# Patient Record
(demographics unavailable — no encounter records)

---

## 2024-12-31 NOTE — HISTORY OF PRESENT ILLNESS
[Home] : at home, [unfilled] , at the time of the visit. [Medical Office: ([facility])___] : at the medical office located in  [Verbal consent obtained from patient] : the patient, [unfilled] [FreeTextEntry1] : Lynda is a 55-year-old female who presented for initial obesity medicine consultationin sept 2023.Patient has struggled to lose weight despite diet and exercise. She is interested in medications to assist the process of weight loss. She is currently perimenopausal and gets her periods but quite infrequently.  she was approved for Mounjaro and currently on 12,5mg dose. Presents for f/u today.  Patient lost an additional 7 pounds since last being seen 1.5 months ago. Notices a significant decrease in nausea since she increased from 10 to 12.5 mg of Mounjaro. Is approximately 10 pounds away from her goal weight and would like to reduce dose back to 10 mg. I will place a new prescription today.  Labs reviewed that were done on June 19 and within normal limits. Hemoglobin A1c 5.8, prediabetic. Will repeat labs when she is next seen in January 2025.

## 2025-01-07 NOTE — REASON FOR VISIT
[TextEntry] : 55-year-old female who presents for finding of right ureteral stricture  Patient recently established with a new gynecologist who worked her up for endometriosis.  She had an MRI of her pelvis which had an incidental finding of right distal ureter narrowing and dilation in the more proximal ureter.  She denies urologic interventions of a possible passed stone in 2022. Per ER records, she had LLQ pain tender to palpation. She had a CT scan that showed right hydronephrosis to the distal ureter without any stone obvious.  She saw an outside urologist, but no further imaging was ever performed.  She denies ureteroscopy.  She denies pelvic radiation.  She denies pelvic surgeries.  At baseline she voids every 5 hours, denies nocturia, denies difficulty with emptying, denies issue with UTIs, denies gross hematuria.  She does report that in her 20s she would struggling with frequent UTIs.  She did see a urologist who performed an imaging test with a catheter and contrast.  She reports that she was told that she had a narrowing that would make it hard for her to void.   She has a history of 2 C-sections.  She is currently going through menopause.  She denies vaginal bulge.  She has regular bowels  She has asthma and restless leg syndrome  Chart review revealed:  CT abdomen pelvis 2/2022-left parapelvic cysts without hydronephrosis, moderate right hydronephrosis and hydroureter to the level of the bladder without obstructing calculi.  MR pelvis with and without contrast 12/2024-uterine adenomyosis, chronic marked dilated distal right ureter downstream of the iliac crossing which tapers towards the UVJ, suspicious for ureteral stricture  Creatinine 1.08 A1c 5.8%

## 2025-01-07 NOTE — ASSESSMENT
[FreeTextEntry1] : 55-year-old female who presents for possible right ureteral stricture  Reviewed her CT findings from 2022 which also showed a right hydronephrosis.  Her MR pelvis does not show kidneys.  Discussed that we will send her for CT urogram to better understand this new finding.  Also discussed that this may lead to additional workups including nuclear Lasix scan. Follow-up CT urogram

## 2025-01-22 NOTE — HISTORY OF PRESENT ILLNESS
[FreeTextEntry1] : Ms. James is a 55-year-old female presenting to the office today for a follow-up pulmonary evaluation for allergic rhinitis, asthma, RLS, and snoring. Her chief complaint is   -she notes numbness and tingling down her R shoulder into her middle finger -she denies carpal tunnel issues -she denies any weakness in her R hand -she notes good quality of sleep  -she notes getting enough sleep -she notes her energy levels are 8/10 -she notes she's no longer using the treadmill in her basement due to mold exposure -she denies taking any new medications, vitamins, or supplements  -she notes she's on a multivitamin -she notes her asthma is well-controlled -she denies any SOB with the cold air -she notes sinus congestion, which she attributes to the dry heat in her house -she notes she's fully retired -she notes her insurance won't cover Mounjaro unless she gets weekly bloodwork -she requests the yearly flu shot 2024  -she denies any headaches, nausea, emesis, fever, chills, sweats, chest pain, chest pressure, coughing, wheezing, palpitations, diarrhea, constipation, dysphagia, vertigo, myalgias, leg swelling, itchy eyes, itchy ears, heartburn, reflux, or sour taste in the mouth.

## 2025-01-22 NOTE — ADDENDUM
[FreeTextEntry1] : Documented by Bonnie Cisse acting as a scribe for Dr. Alan Contreras on 01/22/2025. All medical record entries made by the Scribe were at my, Dr. Alan Contreras's, direction and personally dictated by me on 01/22/2025. I have reviewed the chart and agree that the record accurately reflects my personal performance of the history, physical exam, assessment and plan. I have also personally directed, reviewed, and agree with the discharge instructions.

## 2025-01-22 NOTE — ASSESSMENT
[FreeTextEntry1] : Ms. James is a 55-year-old female with a history of asthma, anemia, covid-19 7/2022 allergies, OSAS, post-nasal drip syndrome, and RLS- Her number one issue is weight (still); RLS (DD in place for OSAS)- improved s/p Mounjaro (insurance off)  problem 1: asthma (quiet) -continue Dulera 200 2BID -continue Alvesco 160 2 puffs BID -continue Ventolin 2 puffs Q6H, pre-exercise - Continue Singulair 10 mg QHS  -Asthma is believed to be caused by inherited (genetic) and environmental factor, but its exact cause is unknown. Asthma may be triggered by allergens, lung infections, or irritants in the air. Asthma triggers are different for each person -Inhaler technique reviewed as well as oral hygiene techniques reviewed with patient. Avoidance of cold air, extremes of temperature, rescue inhaler should be used before exercise. Order of medication reviewed with patient. Recommended use of a cool mist humidifier in the bedroom.  problem 2: allergic rhinitis -continue to use Claritin 10 mg QAM -continue Zyrtec 10 mg QHS -Continue Astelin.10 1 sniff/nostril QHS  -Environmental measures for allergies were encouraged including mattress and pillow cover, air purifier, and environmental controls.  Problem 3: GERD -continue Pepcid 40 mg QHS  Things to avoid including overeating, spicy foods, tight clothing, eating within three hours of bed, this list is not all inclusive. -For treatment of reflux, possible options discussed including diet control, H2 blockers, PPIs, as well as coating motility agents discussed as treatment options. Timing of meals and proximity of last meal to sleep were discussed. If symptoms persist, a formal gastrointestinal evaluation is needed.  problem 4: mildly overweight- improved -s/p Mounjaro (insurance issue)- move to Weight [employer] -recommended consultation with Dr. Dwayne Siegel -Recommend [place] diet -recommended Berberine OTC supplement for visceral fat loss   -Weight loss, exercise, and diet control were discussed and are highly encouraged. Treatment options were given such as, aqua therapy, and contacting a nutritionist. Recommended to use the elliptical, stationary bike, less use of treadmill. Obesity is associated with worsening asthma, shortness of breath, and potential for cardiac disease, diabetes, and other underlying medical conditions.  problem 5: snoring (weight issue) / (+) OSAS (risk factor: snoring, EDS, metabolism) - s/p HSS- DD in place -Recommend Weight loss and positional sleep -Recommend "Excite"  -Sleep apnea is associated with adverse clinical consequences which can affect most organ systems. Cardiovascular disease risk includes arrhythmias, atrial fibrillation, hypertension, coronary artery disease, and stroke. Metabolic disorders include diabetes type 2, non-alcoholic fatty liver disease. Mood disorder especially depression; and cognitive decline especially in the elderly. Associations with chronic reflux/Rosales's esophagus some but not all inclusive. -Reasons include arousal consistent with hypopnea; respiratory events most prominent in REM sleep or supine position; therefore sleep staging and body position are important for accurate diagnosis and estimation of AHI.  problem 6: RLS -Recommend Theraworx -Requip 2 mg extended release QHS -Gabapentin 100 mg QHS (titrate up to 300 mg PRN)  Restless Legs Syndrome (RLS), also known as Hernandez-Ekbom Disease, is a common sleep -related movement disorder. About 1 in 10 adults in the [place] have problems from restless leg syndrome. It also can be seen in about 2% of children. Women are twice as likely as men to have RLS. People with RLS will have symptoms most often during times when they are less active, especially at bedtime. RLS most often causes an overwhelming urge to move your legs and sometimes other parts of your body. This urge is associated with unpleasant sensations in different parts of the body. The symptoms can be mild to severe and can affect your ability to go to sleep and stay asleep. People with RLS often sleep less at night and feel more tired during the day.  problem 7: health maintenance -S/p Covid-19 vaccine x3 -s/p yearly flu shot in office 01/22/2025  -recommended strep pneumonia vaccines: Prevnar-13 vaccine, followed by Pneumo vaccine 23 one year following -recommended early intervention for URIs -recommended regular osteoporosis evaluations -recommended early dermatological evaluations -recommended after the age of 50 to the age of 70, colonoscopy every 5 years  F/P in 6 months with SPI and NiOx She is encouraged to call with any changes, concerns, or questions.

## 2025-01-22 NOTE — PROCEDURE
[FreeTextEntry1] : Full PFT reveals normal flows; FEV1 was 2.32L which is 108% of predicted; normal lung volumes; normal diffusion at 15.83, which is 87% of predicted; normal flow volume loop. PFTs were performed to evaluate for asthma  FENO was not covered by insurance

## 2025-07-23 NOTE — HISTORY OF PRESENT ILLNESS
[FreeTextEntry1] : Ms. James is a 55-year-old female presenting to the office today for a follow-up pulmonary evaluation for allergic rhinitis, asthma, RLS, and snoring.   -she notes energy levels are good -she notes recent group home  -she notes exercising (walking) -she notes recent travel to [place]  -she notes sinuses are quiet -she notes bowels are regular -she notes good quality of sleep -she notes improved sleep quality  -she notes taking Berberine and a multivitamin -she notes she is getting a stress test tomorrow  -she notes s/p physical with PCP who said to control HLD  -she denies any muscle cramps or spasms -she notes vision is stable -she denies dysphonia -she notes wanting to lose weight  -she notes wanting to further strengthen her muscles  -she notes interest in Zepbound   -she denies any headaches, nausea, emesis, fever, chills, sweats, chest pain, chest pressure, coughing, wheezing, palpitations, diarrhea, constipation, dysphagia, vertigo, arthralgias, myalgias, leg swelling, itchy eyes, itchy ears, heartburn, reflux, or sour taste in the mouth

## 2025-07-23 NOTE — ADDENDUM
[FreeTextEntry1] : Documented by Vanesa Waller acting as a scribe for Dr. Alan Contreras on 07/23/2025. All medical record entries made by the Scribe were at my, Dr. Alan Contreras's, direction and personally dictated by me on 07/23/2025.  I have reviewed the chart and agree that the record accurately reflects my personal performance of the history, physical exam, assessment and plan. I have also personally directed, reviewed, and agree with the discharge instructions.

## 2025-07-23 NOTE — ASSESSMENT
[FreeTextEntry1] : Ms. James is a 55-year-old female with a history of asthma, anemia, covid-19 7/2022 allergies, OSAS, post-nasal drip syndrome, and RLS- Her number one issue is weight (still); RLS (DD in place for OSAS)- improved s/p Mounjaro (insurance off); still OW issues #1   problem 1: asthma (quiet) -continue Dulera 200 2BID -continue Alvesco 160 2 puffs BID -continue Ventolin 2 puffs Q6H, pre-exercise - Continue Singulair 10 mg QHS  -Asthma is believed to be caused by inherited (genetic) and environmental factor, but its exact cause is unknown. Asthma may be triggered by allergens, lung infections, or irritants in the air. Asthma triggers are different for each person -Inhaler technique reviewed as well as oral hygiene techniques reviewed with patient. Avoidance of cold air, extremes of temperature, rescue inhaler should be used before exercise. Order of medication reviewed with patient. Recommended use of a cool mist humidifier in the bedroom.  problem 2: allergic rhinitis -continue to use Claritin 10 mg QAM -continue Zyrtec 10 mg QHS -Continue Astelin.10 1 sniff/nostril QHS  -Environmental measures for allergies were encouraged including mattress and pillow cover, air purifier, and environmental controls.  Problem 3: GERD -continue Pepcid 40 mg QHS  Things to avoid including overeating, spicy foods, tight clothing, eating within three hours of bed, this list is not all inclusive. -For treatment of reflux, possible options discussed including diet control, H2 blockers, PPIs, as well as coating motility agents discussed as treatment options. Timing of meals and proximity of last meal to sleep were discussed. If symptoms persist, a formal gastrointestinal evaluation is needed.  problem 4: (+)  overweight- improved -Zepbound initiation  -s/p Mounjaro (insurance issue)- move to Weight Watchers -recommended consultation with Dr. Dwayne Siegel -Recommend [place] diet -recommended Berberine OTC supplement for visceral fat loss   -Weight loss, exercise, and diet control were discussed and are highly encouraged. Treatment options were given such as, aqua therapy, and contacting a nutritionist. Recommended to use the elliptical, stationary bike, less use of treadmill. Obesity is associated with worsening asthma, shortness of breath, and potential for cardiac disease, diabetes, and other underlying medical conditions.  problem 5: snoring (weight issue) / (+) OSAS (risk factor: snoring, EDS, metabolism) -OSAS- Zepbound initiation  - s/p HSS- DD in place -Recommend Weight loss and positional sleep -Recommend "Excite"  -Sleep apnea is associated with adverse clinical consequences which can affect most organ systems. Cardiovascular disease risk includes arrhythmias, atrial fibrillation, hypertension, coronary artery disease, and stroke. Metabolic disorders include diabetes type 2, non-alcoholic fatty liver disease. Mood disorder especially depression; and cognitive decline especially in the elderly. Associations with chronic reflux/Rosales's esophagus some but not all inclusive. -Reasons include arousal consistent with hypopnea; respiratory events most prominent in REM sleep or supine position; therefore sleep staging and body position are important for accurate diagnosis and estimation of AHI.  problem 6: RLS -Recommend Theraworx -Requip 2 mg extended release QHS -Gabapentin 100 mg QHS (titrate up to 300 mg PRN)  Restless Legs Syndrome (RLS), also known as Hernandez-Ekbom Disease, is a common sleep -related movement disorder. About 1 in 10 adults in the [place] have problems from restless leg syndrome. It also can be seen in about 2% of children. Women are twice as likely as men to have RLS. People with RLS will have symptoms most often during times when they are less active, especially at bedtime. RLS most often causes an overwhelming urge to move your legs and sometimes other parts of your body. This urge is associated with unpleasant sensations in different parts of the body. The symptoms can be mild to severe and can affect your ability to go to sleep and stay asleep. People with RLS often sleep less at night and feel more tired during the day.  problem 7: health maintenance -S/p Covid-19 vaccine x3 -s/p yearly flu shot in office 01/22/2025  -recommended strep pneumonia vaccines: Prevnar-13 vaccine, followed by Pneumo vaccine 23 one year following -recommended early intervention for URIs -recommended regular osteoporosis evaluations -recommended early dermatological evaluations -recommended after the age of 50 to the age of 70, colonoscopy every 5 years  F/P in 6 months with SPI and NiOx She is encouraged to call with any changes, concerns, or questions.

## 2025-07-23 NOTE — PROCEDURE
[FreeTextEntry1] : PFTs revealed normal flows; FEV1 was 2.09 L, which is 98% of predicted; normal flow volume loop.  PFTs were performed to evaluate for asthma
